# Patient Record
Sex: MALE | Race: WHITE | NOT HISPANIC OR LATINO | URBAN - NONMETROPOLITAN AREA
[De-identification: names, ages, dates, MRNs, and addresses within clinical notes are randomized per-mention and may not be internally consistent; named-entity substitution may affect disease eponyms.]

---

## 2018-07-10 NOTE — PATIENT DISCUSSION
CONJ NEVUS, OS: PRESCRIBED UV PROTECTION TO MINIMIZE UV EXPOSURE. RETURN FOR FOLLOW-UP AS SCHEDULED.

## 2018-07-10 NOTE — PATIENT DISCUSSION
HYDROXYCHLOROQUINE (PLAQUENIL) USE:  NO OBVIOUS OCULAR TOXICITY OU ON TODAYS DILATED EXAM. POSSIBLE SLIGHT CHANGES IN MAC[place]A APPEARANCE IN THE LEFT EYE. WILL CONSULT DR. WADE FOR FURTHER EVALUATION. CONTINUE PLAQUENIL AS DIRECTED. SCHEDULE YEARLY HVF10-2  AND ASSESMENT OF COLOR PLATES. PATIENT INSTRUCTED TO RETURN TO PCP.

## 2018-07-10 NOTE — PATIENT DISCUSSION
Continue: Refresh Tears (carboxymethylcellulose sodium): drops: 0.5% 1 drop four times a day as needed into both eyes

## 2018-07-23 NOTE — PATIENT DISCUSSION
PT HAS VF SCHEDULED TOMORROW WITH DR BOWIE. DOES NOT LOOK LIKE PLAQ TOXICITY CLINICALLY OR ON MAC OCT. CONTINUE 200MG DAILY. FOLLOW UP AS SCHEDULED.

## 2019-07-22 NOTE — PATIENT DISCUSSION
NO SIGN OF PLAQ TOXICITY; NO CHANGES COMPARED WITH PREVIOUS VF.   FOLLOW UP IN 6 MONTHS FOR DILATED EXAM.

## 2019-11-18 ENCOUNTER — IMPORTED ENCOUNTER (OUTPATIENT)
Dept: URBAN - NONMETROPOLITAN AREA CLINIC 1 | Facility: CLINIC | Age: 49
End: 2019-11-18

## 2019-11-18 PROBLEM — H52.4: Noted: 2019-11-18

## 2019-11-18 PROBLEM — H52.223: Noted: 2019-11-18

## 2019-11-18 PROBLEM — H25.13: Noted: 2019-11-18

## 2019-11-18 PROBLEM — H52.13: Noted: 2019-11-18

## 2019-11-18 PROCEDURE — S0620 ROUTINE OPHTHALMOLOGICAL EXA: HCPCS

## 2019-11-18 PROCEDURE — 92310 CONTACT LENS FITTING OU: CPT

## 2019-11-18 NOTE — PATIENT DISCUSSION
Compound Myopic Astigmatism OU w/Presbyopia-  Discussed refractive status w/ pt today-  new spectacle/CL Rx issued-  continue to monitor yearly or prnCataracts OU-  discussed findings w/patient-  no treatment indicated at this time-  UV protection recommended-  continue to monitor yearly or prn; 's Notes: MR 11/18/2019DFE 11/18/2019

## 2022-04-09 ASSESSMENT — VISUAL ACUITY
OD_SC: 20/20
OS_SC: 20/25

## 2022-04-09 ASSESSMENT — TONOMETRY
OS_IOP_MMHG: 18
OD_IOP_MMHG: 18

## 2022-09-19 NOTE — PATIENT DISCUSSION
POSTERIOR VITREOUS DETACHMENT, OS: NO HOLES. NO TEARS. RETINAL DETACHMENT WARNINGSDISCUSSED. FLOATERS AND FLASHES BROCHURE GIVEN. RETURN FOR FOLLOW-UP AS SCHEDULED.

## 2022-09-19 NOTE — PATIENT DISCUSSION
BLEPHARITIS, OU: PRESCRIBE WARM COMPRESSES AND EYELID SCRUBS QD-BID, ARTIFICIAL TEARS BIDQID,AND ERYTHROMYCIN OPHTHALMIC OINTMENT EVERY NIGHT AS NEEDED. RETURN FOR FOLLOWUPAS SCHEDULED.

## 2022-11-18 NOTE — PATIENT DISCUSSION
Continue: Refresh Tears (carboxymethylcellulose sodium): drops: 0.5% 1 drop four times a day as needed into both eyes Adbry Pregnancy And Lactation Text: It is unknown if this medication will adversely affect pregnancy or breast feeding.